# Patient Record
Sex: FEMALE | Race: WHITE | Employment: STUDENT | URBAN - METROPOLITAN AREA
[De-identification: names, ages, dates, MRNs, and addresses within clinical notes are randomized per-mention and may not be internally consistent; named-entity substitution may affect disease eponyms.]

---
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
## 2023-06-29 NOTE — ED AVS SNAPSHOT
Quality 130: Documentation Of Current Medications In The Medical Record: Current Medications Documented Vivienne Hoffman   MRN: [id number]    Department:  THE [facility] in [place]   Date of Visit:  10/1/2017           Disclosure     Insurance plans vary and the physician(s) referred by the ER may not be covered by your plan.  Please contact Detail Level: Detailed If you have been prescribed any medication(s), please fill your prescription right away and begin taking the medication(s) as directed    If the emergency physician has read X-rays, these will be re-interpreted by a radiologist.  If there is a significant Quality 110: Preventive Care And Screening: Influenza Immunization: Influenza Immunization Administered during Influenza season Quality 226: Preventive Care And Screening: Tobacco Use: Screening And Cessation Intervention: Patient screened for tobacco use and is an ex/non-smoker Quality 111:Pneumonia Vaccination Status For Older Adults: Patient received any pneumococcal conjugate or polysaccharide vaccine on or after their 60th birthday and before the end of the measurement period

## (undated) NOTE — ED AVS SNAPSHOT
Michael Stefan   MRN: [id number]    Department:  THE [facility] Emergency Department in [place]   Date of Visit:  5/18/2018           Disclosure     Insurance plans vary and the physician(s) referred by the ER may not be covered by your plan.  Please contact tell this physician (or your personal doctor if your instructions are to return to your personal doctor) about any new or lasting problems. The primary care or specialist physician will see patients referred from the [facility] Emergency Department.  Christophe Cordero

## (undated) NOTE — LETTER
October 1, 2017    Patient: Vivienne Hoffman   Date of Visit: 10/1/2017       To Whom It May Concern:    Edin Torres was seen and treated in our emergency department on 10/1/2017. She should not return to work until Tuesday, October 3, 2017.     If you h

## (undated) NOTE — LETTER
October 1, 2017    Patient: Fredis Giordano   Date of Visit: 10/1/2017       To Whom It May Concern:    Kobe Plascencia was seen and treated in our emergency department on 10/1/2017. She should not return to school until Tuesday, October 3, 2017.     If you